# Patient Record
Sex: MALE | Race: BLACK OR AFRICAN AMERICAN | NOT HISPANIC OR LATINO | Employment: FULL TIME | URBAN - METROPOLITAN AREA
[De-identification: names, ages, dates, MRNs, and addresses within clinical notes are randomized per-mention and may not be internally consistent; named-entity substitution may affect disease eponyms.]

---

## 2024-05-07 ENCOUNTER — HOSPITAL ENCOUNTER (EMERGENCY)
Facility: HOSPITAL | Age: 29
Discharge: HOME | End: 2024-05-07
Attending: EMERGENCY MEDICINE
Payer: MEDICAID

## 2024-05-07 VITALS
TEMPERATURE: 98 F | WEIGHT: 146 LBS | SYSTOLIC BLOOD PRESSURE: 127 MMHG | DIASTOLIC BLOOD PRESSURE: 75 MMHG | OXYGEN SATURATION: 98 % | RESPIRATION RATE: 16 BRPM | HEART RATE: 87 BPM

## 2024-05-07 DIAGNOSIS — S62.650A CLOSED NONDISPLACED FRACTURE OF MIDDLE PHALANX OF RIGHT INDEX FINGER, INITIAL ENCOUNTER: Primary | ICD-10-CM

## 2024-05-07 DIAGNOSIS — M79.645 THUMB PAIN, LEFT: ICD-10-CM

## 2024-05-07 DIAGNOSIS — M79.642 LEFT HAND PAIN: ICD-10-CM

## 2024-05-07 PROCEDURE — 99283 EMERGENCY DEPT VISIT LOW MDM: CPT | Mod: 25

## 2024-05-07 RX ORDER — HYDROCODONE BITARTRATE AND ACETAMINOPHEN 10; 325 MG/1; MG/1
1 TABLET ORAL EVERY 6 HOURS PRN
Qty: 9 TABLET | Refills: 0 | OUTPATIENT
Start: 2024-05-07 | End: 2024-05-14

## 2024-05-07 RX ORDER — HYDROCODONE BITARTRATE AND ACETAMINOPHEN 10; 325 MG/1; MG/1
1 TABLET ORAL EVERY 6 HOURS PRN
Qty: 9 TABLET | Refills: 0 | Status: SHIPPED | OUTPATIENT
Start: 2024-05-07 | End: 2024-05-07

## 2024-05-07 NOTE — ED PROVIDER NOTES
Encounter Date: 5/7/2024       History     Chief Complaint   Patient presents with    Hand Pain     C/o right 2nd digit pain & swelling and left thumb pain x 1 week after finger was smashed between 2 boards     Said last week maybe Thursday or Friday a 2 x 6 8 Ft long came down in smashed his right index finger and another event or the same event I am not clear injured his L thumb metacarpal.  He complains of pain in both index finger on the right-hand worse than the thumb injury on the left hand.  He is dominant right-handed.  Patient has not sought care for this.  Pain is worse when he moves it  Rindex finger pain is worse when he touches the right the left thumb metacarpal    Patient does smoke no alcohol but occasionally.  No more drug use he states.  Does not elaborate.  Patient has no medical history no surgical history he has no known current medications he has no known drug allergies states.  Tetanus is less than 5 years COVID shots in the past yes no pneumonia no flu shots.  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  He is a single man lives with his girlfriend he is employed.  Mother is alive suffers with chronic pain father lost leg due to MVC but has no chronic medical issues.      Review of patient's allergies indicates:  No Known Allergies  No past medical history on file.  No past surgical history on file.  No family history on file.  Social History     Tobacco Use    Smoking status: Every Day     Types: Cigarettes    Smokeless tobacco: Never   Substance Use Topics    Alcohol use: Yes    Drug use: Never     Review of Systems   Constitutional:  Negative for fever.   HENT:  Negative for sore throat.    Eyes: Negative.    Respiratory:  Negative for shortness of breath.    Cardiovascular:  Negative for chest pain.   Gastrointestinal:  Negative for nausea.   Endocrine: Negative.    Genitourinary:  Negative for dysuria.   Musculoskeletal:  Positive for arthralgias and joint swelling (Pain on the thumb metacarpal left side  non dominant side pain in the proximal interphalangeal joint on the right index finger). Negative for back pain.   Skin:  Negative for rash.   Allergic/Immunologic: Negative.    Neurological: Negative.  Negative for weakness.   Hematological:  Does not bruise/bleed easily.       Physical Exam     Initial Vitals [05/07/24 0929]   BP Pulse Resp Temp SpO2   138/79 99 18 97.5 °F (36.4 °C) 99 %      MAP       --         Physical Exam    Nursing note and vitals reviewed.  Constitutional: He appears well-developed and well-nourished. No distress.   Fully awake and oriented nontoxic pleasant gentleman   HENT:   Head: Normocephalic and atraumatic.   Musculoskeletal:         General: Tenderness present.      Comments: On the right-hand there is no sign of injury except to the index finger the proximal interphalangeal joint is swollen and tender it is tender to linear compression range of motion is limited due to discomfort and swelling he can fully straighten it he can flex it a proximally 45° without too much discomfort at the PIP joint.  He can be fully straighten.  He has no extensor tendon deficits.  Sensation is intact capillary refill is good.  Minimum discomfort at the MCP joint.  No swelling noted there range of motion of the MCP appears to be intact.  Range of motion at the distal interphalangeal joint appears to be intact.  On the left hand.  He is tender all along the length of the thumb metacarpal there is no swelling at the MCP of the thumb or the proximal joint of the thumb metacarpal.  Minimum discomfort no significant tenderness on linear compression range of motion in thumb on the left hand is normal capillary refill is normal     Neurological: He is alert and oriented to person, place, and time.   Skin: Skin is warm and dry. Capillary refill takes less than 2 seconds.   Psychiatric: He has a normal mood and affect.         ED Course   Procedures  Labs Reviewed - No data to display       Imaging Results               X-Ray Hand 2 View Left (Final result)  Result time 05/07/24 11:14:52      Final result by Seb Dent MD (05/07/24 11:14:52)                   Impression:      1. No acute osseous defect identified      Electronically signed by: Seb Dent  Date:    05/07/2024  Time:    11:14               Narrative:    EXAMINATION:  XR HAND 2 VIEW LEFT    CLINICAL HISTORY:  Pain in left finger(s); .    COMPARISON:  None available.    FINDINGS:  AP, lateral, and oblique views reveal no definite fracture or dislocation.  Joint spaces appear grossly intact.No aggressive osteolytic or osteoblastic lesion is seen.  There is mild notching at the scaphoid suspicious for normal variant.                        Wet Read by Seb Wilder MD (05/07/24 10:59:33, [facility] - Emergency Dept, Emergency Medicine)    No acute tommy abn                                     X-Ray Finger 2 or More Views Right (Final result)  Result time 05/07/24 11:14:06      Final result by Seb Dent MD (05/07/24 11:14:06)                   Impression:      1. No acute osseous defect identified  2. Soft tissue prominence at the 2nd PIP joint.      Electronically signed by: Seb Dent  Date:    05/07/2024  Time:    11:14               Narrative:    EXAMINATION:  XR FINGER 2 OR MORE VIEWS RIGHT    CLINICAL HISTORY:  ; pain & swelling to right index finger;.    COMPARISON:  None available.    FINDINGS:  Multiple views reveal no definite fracture or dislocation.  There is soft tissue prominence at the a 2nd PIP joint.  No aggressive osteolytic or osteoblastic lesion is seen.  Joint spaces appear grossly intact.                        Wet Read by Seb Wilder MD (05/07/24 10:58:06, [facility] - Emergency Dept, Emergency Medicine)    Possible minute injury to the proximal aspect to the mid phalanx intra-articular                                     Medications - No data to display  Medical Decision Making    Said  last week maybe Thursday or Friday a 2 x 6 a foot long came down in smashed his right index finger and another event or the same event I am not clear injured his thumb med carpal.  He complains of pain in both index finger on the right-hand worse than the thumb injury on the left hand.  He is dominant right-handed.  Patient has not sought care for this.  Pain is worse when he moves it  Rindex finger pain is worse when he touches the right the left thumb metacarpal    Patient does smoke no alcohol but occasionally.  No more drug use he states.  Does not elaborate.  Patient has no medical history no surgical history he has no known current medications he has no known drug allergies states.  Tetanus is less than 5 years COVID shots in the past yes no pneumonia no flu shots.  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  He is a single man lives with his girlfriend he is employed.  Mother is alive suffers with chronic pain father lost leg due to MVC but has no chronic medical issues.        Amount and/or Complexity of Data Reviewed  External Data Reviewed: notes.     Details: ED note from 2022 not very helpful today  Radiology: ordered and independent interpretation performed.     Details: I thought there was a minor fracture at the interphalangeal joint proximal interphalangeal joint of the right index finger I thought the left thumb metacarpal was benign  Discussion of management or test interpretation with external provider(s): Differential diagnosis include but not limited to contusion, cartilage injury, fracture, intra-articular injury can dislocation,    Risk  Prescription drug management.  Risk Details: MDM  Problems addressed  Co-morbidities and/or factors adding to the complexity or risk for the patient:  None known  Problems addressed:  Right index left thumb injury  Acute problem/illness or progression/exacerbation of chronic problem with potential threat to life/bodily dysfunction?:  None known  Differential  diagnoses/problems considered: see above     Amount and/or Complexity of Data Reviewed  Independent Historian: none (see above for summary)  External Data Reviewed: notes from previous ED visits (see above for summary)  Risk and benefits of testing: discussed   Labs: Labs: ordered and reviewed  Radiology:Radiology: ordered and independent interpretation performed (see above or ED course)  ECG/medicine tests:Radiology: ordered and independent interpretation performed (see above or ED course)  none    Risk  Prescription drug management   Diagnosis or treatment significantly impacted by social determinants of health: none   Shared decision making     Critical Care  none      Critical Care  Total time providing critical care: 0 minutes               ED Course as of 05/07/24 1700   Tue May 07, 2024   1059 In my review of that right index finger intra-articularly they maybe a little nondisplaced sliver of bone I do not see anything grossly displaced this is something be treated with leonora taping.  On the left hand the thumb area that he complained of hurting the thumb metacarpal I do not appreciate any acute bony abnormality I did cautioned the patient that the final x-ray interpretation will be within the next 24 hours at maximum.  If there is anything different he would be notified [DM]   1100 Leonora tape the index long finger together on the right hand discussed narcotics narcotics addiction with the patient wrote a prescription for a limited number of hydrocodone tablets recommended he continue the ibuprofen he said was not working well enough.  Because it will be sent her just it with the narcotic.  Excused him from work or school for 48 hours. [DM]      ED Course User Index  [DM] Seb Wilder MD                           Clinical Impression:  Final diagnoses:  [M79.645] Thumb pain, left  [M79.642] Left hand pain - Thumb metacarpal  [S62.650A] Closed nondisplaced fracture of middle phalanx of right index finger,  initial encounter (Primary)          ED Disposition Condition    Discharge Stable          ED Prescriptions       Medication Sig Dispense Start Date End Date Auth. Provider    HYDROcodone-acetaminophen (NORCO)  mg per tablet  (Status: Discontinued) Take 1 tablet by mouth every 6 (six) hours as needed. 9 tablet 5/7/2024 5/7/2024 Seb Wilder MD    HYDROcodone-acetaminophen (NORCO)  mg per tablet Take 1 tablet by mouth every 6 (six) hours as needed. 9 tablet 5/7/2024 -- Seb Wilder MD          Follow-up Information       Follow up With Specialties Details Why Contact Info    Jas Lozoya MD Orthopedic Surgery In 1 week As needed [address] [facility] [address]  [phone]               Seb Wilder MD  05/07/24 1116       Seb Wilder MD  05/07/24 0775

## 2024-05-07 NOTE — Clinical Note
"Eitan"Jesus Roche was seen and treated in our emergency department on 5/7/2024.  He may return to work on 05/09/2024.       If you have any questions or concerns, please don't hesitate to call.      Seb Wilder MD"

## 2024-05-07 NOTE — DISCHARGE INSTRUCTIONS
The final x-ray interpretation will be within the next 24 hours if there is any difference of significant you will be notified    Change the buddy tape any time it gets moist and at least once a day.  You can take it off to take a shower and wash your hands but it needs to be replaced after     keep it buddy tape for a proximally a week to 10 days    Dr. Lozoya in his listed orthopedic doctor on-call his name and numbers provided if you need to follow up    Continue your ibuprofen plus the narcotic any narcotic can cause addiction take it strictly as it is prescribed and stop it as soon as possible no driving or operating machinery 8 hours after taking 1 of the narcotic pain pills        Take medicines as prescribed    See your family doctor in one to 2 days for further evaluation, workup, and treatment as necessary    Avoid driving or operating machinery while taking medicines as some medicines might cause drowsiness and may cause problems. Also pain medicines have potential of being addictive  so use Pain meds specially Narcotics Sparingly.    The exam and treatment you received in Emergency Room was for an urgent problem and NOT INTENDED AS COMPLETE CARE. It is important that you FOLLOW UP with a doctor for ongoing care. If your symptoms become WORSE or you DO NOT IMPROVE and you are unable to reach your health care provider, you should RETURN to the emergency department. The Emergency Room doctor has provided a PRELIMINARY INTERPRETATION of all your STUDIES. A final interpretation may be done after you are discharged. IF A CHANGE in your diagnosis or treatment is needed WE WILL CONTACT YOU. It is critical that we have a CURRENT PHONE NUMBER FOR YOU.

## 2024-05-14 ENCOUNTER — HOSPITAL ENCOUNTER (EMERGENCY)
Facility: HOSPITAL | Age: 29
Discharge: HOME | End: 2024-05-14
Attending: INTERNAL MEDICINE
Payer: MEDICAID

## 2024-05-14 VITALS
TEMPERATURE: 98 F | HEART RATE: 88 BPM | RESPIRATION RATE: 16 BRPM | WEIGHT: 145 LBS | HEIGHT: 73 IN | DIASTOLIC BLOOD PRESSURE: 98 MMHG | BODY MASS INDEX: 19.22 KG/M2 | SYSTOLIC BLOOD PRESSURE: 146 MMHG | OXYGEN SATURATION: 98 %

## 2024-05-14 DIAGNOSIS — S62.650A CLOSED NONDISPLACED FRACTURE OF MIDDLE PHALANX OF RIGHT INDEX FINGER, INITIAL ENCOUNTER: Primary | ICD-10-CM

## 2024-05-14 PROCEDURE — 63600175 PHARM REV CODE 636 W HCPCS: Performed by: NURSE PRACTITIONER

## 2024-05-14 PROCEDURE — 99284 EMERGENCY DEPT VISIT MOD MDM: CPT | Mod: 25

## 2024-05-14 PROCEDURE — 96372 THER/PROPH/DIAG INJ SC/IM: CPT | Performed by: NURSE PRACTITIONER

## 2024-05-14 RX ORDER — KETOROLAC TROMETHAMINE 30 MG/ML
60 INJECTION, SOLUTION INTRAMUSCULAR; INTRAVENOUS
Status: COMPLETED | OUTPATIENT
Start: 2024-05-14 | End: 2024-05-14

## 2024-05-14 RX ORDER — HYDROCODONE BITARTRATE AND ACETAMINOPHEN 7.5; 325 MG/1; MG/1
1 TABLET ORAL EVERY 6 HOURS PRN
Qty: 12 TABLET | Refills: 0 | Status: SHIPPED | OUTPATIENT
Start: 2024-05-14

## 2024-05-14 RX ORDER — HYDROCODONE BITARTRATE AND ACETAMINOPHEN 7.5; 325 MG/1; MG/1
1 TABLET ORAL EVERY 6 HOURS PRN
Qty: 12 TABLET | Refills: 0 | Status: SHIPPED | OUTPATIENT
Start: 2024-05-14 | End: 2024-05-14

## 2024-05-14 RX ADMIN — KETOROLAC TROMETHAMINE 60 MG: 30 INJECTION, SOLUTION INTRAMUSCULAR at 01:05

## 2024-05-14 NOTE — ED PROVIDER NOTES
Encounter Date: 5/14/2024       History     Chief Complaint   Patient presents with    Hand Injury     R hand injury    seen here and referred to Dr Darell Lozoya unaware and refused to see      Patient is a 28-year-old male who presents to the emergency department with right index finger pain.  Allegedly on the 7th he was seen evaluated told he possibly had a fracture and tried to follow up with Orthopedic but they would not see him.  The patient remove the splint that he had placed on because he said it got dirty and did not get another splint.  He comes in today with complaints of pain to the index finger swelling and states it is locked.  He states he did have good range of motion but now it is stiff and he can not move it.  Patient denies any other complaints or associated symptoms at this time.      Review of patient's allergies indicates:  No Known Allergies  No past medical history on file.  No past surgical history on file.  No family history on file.  Social History     Tobacco Use    Smoking status: Every Day     Types: Cigarettes    Smokeless tobacco: Never   Substance Use Topics    Alcohol use: Yes    Drug use: Never     Review of Systems   Constitutional:  Negative for activity change, appetite change and fever.   HENT:  Negative for congestion, dental problem and sore throat.    Eyes:  Negative for discharge and itching.   Respiratory:  Negative for apnea, chest tightness and shortness of breath.    Cardiovascular:  Negative for chest pain.   Gastrointestinal:  Negative for abdominal distention, abdominal pain and nausea.   Genitourinary:  Negative for dysuria.   Musculoskeletal:  Positive for arthralgias, joint swelling and myalgias. Negative for back pain.   Skin:  Negative for rash.   Neurological:  Negative for weakness.   Hematological:  Does not bruise/bleed easily.   Psychiatric/Behavioral:  Negative for agitation and behavioral problems.    All other systems reviewed and are  negative.      Physical Exam     Initial Vitals [05/14/24 1324]   BP Pulse Resp Temp SpO2   (!) 146/98 88 16 98 °F (36.7 °C) 98 %      MAP       --         Physical Exam    Nursing note and vitals reviewed.  Constitutional: Vital signs are normal. He appears well-developed and well-nourished.  Non-toxic appearance. He does not have a sickly appearance.   HENT:   Head: Normocephalic and atraumatic.   Eyes: Conjunctivae, EOM and lids are normal. Lids are everted and swept, no foreign bodies found.   Neck: Trachea normal and phonation normal. Neck supple. No thyroid mass and no thyromegaly present.   Normal range of motion.   Full passive range of motion without pain.     Cardiovascular:  Normal rate, regular rhythm, S1 normal, S2 normal, normal heart sounds, intact distal pulses and normal pulses.           Musculoskeletal:         General: Tenderness and edema present.      Cervical back: Full passive range of motion without pain, normal range of motion and neck supple.      Comments: Limited range of motion of the right index finger mainly due to the pain and swelling.  Tender mainly on the middle phalanx of the affected finger.     Lymphadenopathy:     He has no cervical adenopathy.   Neurological: He is alert and oriented to person, place, and time. He has normal strength.   Skin: Skin is warm, dry and intact. Capillary refill takes less than 2 seconds.   Psychiatric: He has a normal mood and affect. His speech is normal and behavior is normal. Judgment normal. Cognition and memory are normal.         ED Course   Procedures  Labs Reviewed - No data to display       Imaging Results    None          Medications   ketorolac injection 60 mg (has no administration in time range)     Medical Decision Making  Patient is a 28-year-old male who presents to the emergency department with right index finger pain.  Allegedly on the 7th he was seen evaluated told he possibly had a fracture and tried to follow up with Orthopedic  but they would not see him.  The patient remove the splint that he had placed on because he said it got dirty and did not get another splint.  He comes in today with complaints of pain to the index finger swelling and states it is locked.  He states he did have good range of motion but now it is stiff and he can not move it.  Patient denies any other complaints or associated symptoms at this time.    Problems Addressed:  Closed nondisplaced fracture of middle phalanx of right index finger, initial encounter: acute illness or injury     Details: After review of records along with the x-rays it appears he does have a small fracture of the middle phalanx in the intra-articular joint.  He went to follow up with Orthopedics but was refused because allegedly he did not have referral.  The patient did not follow up and did not keep his splint on and now has swelling and states the fingers tight and difficult to move.  Will refer to [place] for orthopedic follow up.  Will give pain medicine and put back in finger splint.  Strict ED return precautions discussed for any change or worsening symptoms.    Amount and/or Complexity of Data Reviewed  External Data Reviewed: labs, radiology and notes.  Labs: ordered. Decision-making details documented in ED Course.    Risk  Prescription drug management.                                      Clinical Impression:  Final diagnoses:  [P19.529J] Closed nondisplaced fracture of middle phalanx of right index finger, initial encounter (Primary)          ED Disposition Condition    Discharge Stable          ED Prescriptions       Medication Sig Dispense Start Date End Date Auth. Provider    HYDROcodone-acetaminophen (NORCO) 7.5-325 mg per tablet Take 1 tablet by mouth every 6 (six) hours as needed for Pain. 12 tablet 5/14/2024 -- Arsen Hinson FNP          Follow-up Information    None          Arsen Hinson FNP  05/14/24 6000

## 2024-05-14 NOTE — Clinical Note
"Eitan "Eitan"Melchor was seen and treated in our emergency department on 5/14/2024.  He may return to work on 05/16/2024.       If you have any questions or concerns, please don't hesitate to call.      Arsen Hinson, RAVINDRA"

## 2024-05-14 NOTE — DISCHARGE INSTRUCTIONS
Take medicines as prescribed    See your family doctor in one to 2 days for further evaluation, workup, and treatment as necessary    Avoid driving or operating machinery while taking medicines as some medicines might cause drowsiness and may cause problems. Also pain medicines have potential of being addictive  so use Pain meds specially Narcotics Sparingly.    The exam and treatment you received in Emergency Room was for an urgent problem and NOT INTENDED AS COMPLETE CARE. It is important that you FOLLOW UP with a doctor for ongoing care. If your symptoms become WORSE or you DO NOT IMPROVE and you are unable to reach your health care provider, you should RETURN to the emergency department. The Emergency Room doctor has provided a PRELIMINARY INTERPRETATION of all your STUDIES. A final interpretation may be done after you are discharged. IF A CHANGE in your diagnosis or treatment is needed WE WILL CONTACT YOU. It is critical that we have a CURRENT PHONE NUMBER FOR YOU.  Keep his finger covered with a splint this time as you remove the previous splint.  Please keep the finger elevated and ice.  Additional medication is given been given T for pain use it sparingly because it can cause addiction.  Please follow up with Orthopedics as discussed.  If your condition changes or worsens at any time please do not hesitate to return back to the emergency department.

## 2024-05-29 ENCOUNTER — OFFICE VISIT (OUTPATIENT)
Dept: ORTHOPEDICS | Facility: CLINIC | Age: 29
End: 2024-05-29
Payer: MEDICAID

## 2024-05-29 ENCOUNTER — HOSPITAL ENCOUNTER (OUTPATIENT)
Dept: RADIOLOGY | Facility: HOSPITAL | Age: 29
Discharge: HOME | End: 2024-05-29
Attending: SPECIALIST
Payer: MEDICAID

## 2024-05-29 VITALS
TEMPERATURE: 99 F | WEIGHT: 155 LBS | HEIGHT: 73 IN | SYSTOLIC BLOOD PRESSURE: 126 MMHG | DIASTOLIC BLOOD PRESSURE: 79 MMHG | BODY MASS INDEX: 20.54 KG/M2

## 2024-05-29 DIAGNOSIS — M79.641 RIGHT HAND PAIN: ICD-10-CM

## 2024-05-29 DIAGNOSIS — M79.641 RIGHT HAND PAIN: Primary | ICD-10-CM

## 2024-05-29 PROCEDURE — 73130 X-RAY EXAM OF HAND: CPT | Mod: TC,RT

## 2024-05-29 PROCEDURE — 1159F MED LIST DOCD IN RCRD: CPT | Mod: CPTII,,, | Performed by: SPECIALIST

## 2024-05-29 PROCEDURE — 3078F DIAST BP <80 MM HG: CPT | Mod: CPTII,,, | Performed by: SPECIALIST

## 2024-05-29 PROCEDURE — 99499 UNLISTED E&M SERVICE: CPT | Mod: S$PBB,,, | Performed by: SPECIALIST

## 2024-05-29 PROCEDURE — 3008F BODY MASS INDEX DOCD: CPT | Mod: CPTII,,, | Performed by: SPECIALIST

## 2024-05-29 PROCEDURE — 99213 OFFICE O/P EST LOW 20 MIN: CPT | Mod: PBBFAC,25

## 2024-05-29 PROCEDURE — 3074F SYST BP LT 130 MM HG: CPT | Mod: CPTII,,, | Performed by: SPECIALIST

## 2024-05-29 RX ORDER — IBUPROFEN 800 MG/1
800 TABLET ORAL EVERY 8 HOURS PRN
Qty: 42 TABLET | Refills: 0 | Status: SHIPPED | OUTPATIENT
Start: 2024-05-29

## 2024-05-29 NOTE — PROGRESS NOTES
[facility] Clinic Note    In brief, Eitan Roche is a 29 y.o. male right-hand dominant  presents to clinic today for evaluation of right hand injury.  He says on around 5/17 something fell onto his hand.  He had pain and swelling to the right index finger.  Difficulty moving it.  Denies any numbness tingling.      PMH: History reviewed. No pertinent past medical history.    PSH: History reviewed. No pertinent surgical history.    SH:   Social History     Socioeconomic History    Marital status: Single   Tobacco Use    Smoking status: Every Day     Types: Cigarettes    Smokeless tobacco: Never   Substance and Sexual Activity    Alcohol use: Yes    Drug use: Never       FH:   Family History   Problem Relation Name Age of Onset    No Known Problems Mother      No Known Problems Father         Allergies: Review of patient's allergies indicates:  No Known Allergies    ROS:  Constitutional- no fever, fatigue, weakness  Eye- no vision loss, eye redness, drainage, or pain  ENMT- no sore throat, ear pain, sinus pain/congestion, nasal congestion/drainage  Respiratory- no cough, wheezing, or shortness of breath  CV- no chest pain, no palpitations, no edema  GI- no N/V/D; no abdominal pain    Physical Exam:  Vitals:    05/29/24 0841   BP: 126/79   Temp: 98.6 °F (37 °C)       General: NAD  Cardio: RRR by peripheral pulse  Pulm: Normal WOB on room air, symmetric chest rise  Abd: Soft, NT/ND    MSK:  Right hand   Significant swelling to the PIP joint of the index finger   Patient is slowly able to make a full composite fist with no evidence of scissoring or extensor lag FDP FDS are intact to the index finger   Neurovascularly intact    Imaging:   X-ray of the right hand obtained and interpreted clinic today demonstrates no evidence of fracture dislocation, soft tissue swelling to the right index PIP joint    Assessment:  29-year-old male with right index finger injury    -I discussed with the  patient I do not see any obvious fracture on x-ray.  He is significant swelling to the PIP joint.  It is possible he tore something or there is a small fracture that I can not see either way the swelling we will need to resolve and recommend that he continue to work on his range of motion of the hand.  Weightbearing as tolerated follow up ligia Ferrer MD  [facility] Orthopaedic Surgery  5/29/2024 8:45 AM

## 2024-10-25 ENCOUNTER — PATIENT MESSAGE (OUTPATIENT)
Dept: RESEARCH | Facility: HOSPITAL | Age: 29
End: 2024-10-25
Payer: MEDICAID